# Patient Record
Sex: MALE | Race: WHITE | NOT HISPANIC OR LATINO | Employment: UNEMPLOYED | URBAN - METROPOLITAN AREA
[De-identification: names, ages, dates, MRNs, and addresses within clinical notes are randomized per-mention and may not be internally consistent; named-entity substitution may affect disease eponyms.]

---

## 2022-02-09 ENCOUNTER — HOSPITAL ENCOUNTER (EMERGENCY)
Facility: CLINIC | Age: 27
Discharge: HOME | End: 2022-02-10
Attending: FAMILY MEDICINE | Admitting: FAMILY MEDICINE
Payer: COMMERCIAL

## 2022-02-09 DIAGNOSIS — S01.511A LIP LACERATION, INITIAL ENCOUNTER: ICD-10-CM

## 2022-02-09 PROCEDURE — 12011 RPR F/E/E/N/L/M 2.5 CM/<: CPT

## 2022-02-09 PROCEDURE — 12011 RPR F/E/E/N/L/M 2.5 CM/<: CPT | Performed by: FAMILY MEDICINE

## 2022-02-09 PROCEDURE — 99284 EMERGENCY DEPT VISIT MOD MDM: CPT | Mod: 25 | Performed by: FAMILY MEDICINE

## 2022-02-09 PROCEDURE — 99284 EMERGENCY DEPT VISIT MOD MDM: CPT | Mod: 25

## 2022-02-10 VITALS
DIASTOLIC BLOOD PRESSURE: 95 MMHG | TEMPERATURE: 97.6 F | OXYGEN SATURATION: 100 % | RESPIRATION RATE: 20 BRPM | WEIGHT: 250 LBS | SYSTOLIC BLOOD PRESSURE: 158 MMHG | HEART RATE: 82 BPM

## 2022-02-10 PROCEDURE — 90471 IMMUNIZATION ADMIN: CPT | Performed by: FAMILY MEDICINE

## 2022-02-10 PROCEDURE — 90715 TDAP VACCINE 7 YRS/> IM: CPT | Performed by: FAMILY MEDICINE

## 2022-02-10 PROCEDURE — 250N000011 HC RX IP 250 OP 636: Performed by: FAMILY MEDICINE

## 2022-02-10 RX ADMIN — CLOSTRIDIUM TETANI TOXOID ANTIGEN (FORMALDEHYDE INACTIVATED), CORYNEBACTERIUM DIPHTHERIAE TOXOID ANTIGEN (FORMALDEHYDE INACTIVATED), BORDETELLA PERTUSSIS TOXOID ANTIGEN (GLUTARALDEHYDE INACTIVATED), BORDETELLA PERTUSSIS FILAMENTOUS HEMAGGLUTININ ANTIGEN (FORMALDEHYDE INACTIVATED), BORDETELLA PERTUSSIS PERTACTIN ANTIGEN, AND BORDETELLA PERTUSSIS FIMBRIAE 2/3 ANTIGEN 0.5 ML: 5; 2; 2.5; 5; 3; 5 INJECTION, SUSPENSION INTRAMUSCULAR at 00:12

## 2022-02-10 NOTE — ED PROVIDER NOTES
ED Provider Note     Fransico Hernández  [id number]  February 9, 2022      CC:     Chief Complaint   Patient presents with     Laceration          History is obtained from the patient.    HPI: Fransico Hernández is a 26 year old male presenting with laceration to the upper lip near the midline after he was splitting wood, and a piece of wood came back and hit him in the face.  He did not have any loss of consciousness and has no dental trauma.  Injury occurred about an hour ago.  Patient has no other health problems, not on any current medications and has no known drug allergies.  Patient works as a .  He has not had previous lacerations except on the chin when he was a child.  Patient's last tetanus shot was in 2009.    PMH/Problem List:   History reviewed. No pertinent past medical history.    PSH: History reviewed. No pertinent surgical history.    MEDS: No current facility-administered medications on file prior to encounter.  No current outpatient medications on file prior to encounter.      Allergies: Patient has no known allergies.    Triage and nursing notes were reviewed.    ROS: All other systems were reviewed and are negative    Physical Exam:  Vitals:    02/09/22 2233   BP: (!) 158/95   Pulse: 94   Resp: 20   Temp: 97.6  F (36.4  C)   TempSrc: Temporal   SpO2: 100%   Weight: 113.4 kg (250 lb)     GENERAL APPEARANCE: Alert and oriented x3, in no apparent distress  HEAD: atraumatic  EYES: PERRL, EOMI  HENT: Patient has a 2-1/2 cm laceration through the upper lip towards the septum of the nose.  The vermilion border is crossed and is gaping.  Bleeding is minimal.  No dental trauma  NECK: no adenopathy or masses, trachea is midline  RESP: lungs clear to auscultation - no rales, rhonchi or wheezes  CV: regular rate and rhythm, no significant murmurs or rubs  ABDOMEN: soft, nontender, no masses with normal bowel sounds  SKIN: As  above  NEURO: mentation and speech normal; no focal deficits    Labs/Imaging Results:  No results found for this or any previous visit (from the past 24 hour(s)).      Procedure Note: Patient has a 2.5 cm laceration to the upper lip involving the vermilion border.  The wound was locally anesthetized with approximately 2-1/2 cc of 1% lidocaine with epinephrine.  The wound was irrigated, prepped and draped in usual sterile fashion, and closed with 6 stitches using 5-0 Ethilon material.      Impression:  Final diagnoses:   Lip laceration, initial encounter         ED Course & Medical Decision Making (Plan):  Fransico Hernández is a 26 year old male with a upper lip laceration, involving the vermilion border, requiring 6 stitches using 5-0 Ethilon material to close the wound.  Patient's wound was inspected in a bloodless field and there is no foreign bodies.  Edges were approximated, and the patient tolerated the closure well.  Patient was given a update on his diphtheria and tetanus tonight.  His last booster was in 2008.  Aftercare instructions were discussed.  Patient expressed understanding and agreement with discharge instructions below.  Patient is planning on going on a fishing trip next Wednesday through the weekend with some of his friends.    Written after-visit summary and instructions were given at the time of discharge.        Discharge Instructions:  You have a 1 inch laceration of the upper lip going through the vermilion border (color portion of the upper lip)  Laceration was closed with 6 stitches.  Please keep this area clean and dry until tomorrow afternoon.  You can then begin to wash gently with soap and water, and apply some bacitracin ointment after cleaning twice a day.  Have your stitches removed in 5-7 days.  Return if you have any signs of infection such as redness, swelling or drainage.  See the attached handout.  You received your tetanus booster tonight.        Disclaimer: This note consists  of words and symbols derived from keyboarding and dictation using voice recognition software.  As a result, there may be errors that have gone undetected.  Please consider this when interpreting information found in this note.       Haroon Peña MD  02/10/22 0010

## 2022-02-10 NOTE — ED TRIAGE NOTES
Pt was splitting wood and a piece of wood came up and hit him in the top lip. Pt has a LAC to upper lip, bleeding is controlled at this time.

## 2022-02-10 NOTE — DISCHARGE INSTRUCTIONS
You have a 1 inch laceration of the upper lip going through the vermilion border (color portion of the upper lip)  Laceration was closed with 6 stitches.  Please keep this area clean and dry until tomorrow afternoon.  You can then begin to wash gently with soap and water, and apply some bacitracin ointment after cleaning twice a day.  Have your stitches removed in 5-7 days.  Return if you have any signs of infection such as redness, swelling or drainage.  See the attached handout.  You received your tetanus booster tonight.